# Patient Record
Sex: MALE | Race: WHITE | NOT HISPANIC OR LATINO | URBAN - METROPOLITAN AREA
[De-identification: names, ages, dates, MRNs, and addresses within clinical notes are randomized per-mention and may not be internally consistent; named-entity substitution may affect disease eponyms.]

---

## 2023-11-09 ENCOUNTER — NURSING HOME VISIT (OUTPATIENT)
Dept: POST ACUTE CARE | Facility: EXTERNAL LOCATION | Age: 78
End: 2023-11-09
Payer: MEDICARE

## 2023-11-09 VITALS — TEMPERATURE: 98.8 F | DIASTOLIC BLOOD PRESSURE: 61 MMHG | HEART RATE: 83 BPM | SYSTOLIC BLOOD PRESSURE: 100 MMHG

## 2023-11-09 DIAGNOSIS — F41.9 ANXIETY: ICD-10-CM

## 2023-11-09 DIAGNOSIS — S06.5XAA SDH (SUBDURAL HEMATOMA) (MULTI): ICD-10-CM

## 2023-11-09 DIAGNOSIS — I48.20 CHRONIC ATRIAL FIBRILLATION (MULTI): Primary | ICD-10-CM

## 2023-11-09 DIAGNOSIS — M62.81 MUSCLE WEAKNESS (GENERALIZED): ICD-10-CM

## 2023-11-09 DIAGNOSIS — Z85.118 PERSONAL HISTORY OF OTHER MALIGNANT NEOPLASM OF BRONCHUS AND LUNG: ICD-10-CM

## 2023-11-09 DIAGNOSIS — M15.9 PRIMARY OSTEOARTHRITIS INVOLVING MULTIPLE JOINTS: ICD-10-CM

## 2023-11-09 DIAGNOSIS — Z93.3 COLOSTOMY IN PLACE (MULTI): ICD-10-CM
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
## 2023-11-09 PROBLEM — R45.851 SUICIDAL IDEATION: Status: ACTIVE | Noted: 2023-11-09

## 2023-11-09 PROBLEM — R29.6 FALLS: Status: ACTIVE | Noted: 2023-11-09

## 2023-11-09 PROBLEM — G47.33 OSA (OBSTRUCTIVE SLEEP APNEA): Status: ACTIVE | Noted: 2023-11-09

## 2023-11-09 PROBLEM — K57.50 DIVERTICUL DISEASE SMALL AND LARGE INTESTINE, NO PERFORATI OR ABSCESS: Status: ACTIVE | Noted: 2023-11-09

## 2023-11-09 PROBLEM — G20.A1 PARKINSON'S DISEASE WITHOUT DYSKINESIA OR FLUCTUATING MANIFESTATIONS (MULTI): Status: ACTIVE | Noted: 2023-11-09
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
## 2023-11-09 PROBLEM — M15.0 PRIMARY OSTEOARTHRITIS INVOLVING MULTIPLE JOINTS: Status: ACTIVE | Noted: 2023-11-09

## 2023-11-09 PROCEDURE — 99342 HOME/RES VST NEW LOW MDM 30: CPT | Performed by: NURSE PRACTITIONER

## 2023-11-09 RX ORDER — FLECAINIDE ACETATE 150 MG/1
0.5 TABLET ORAL
COMMUNITY
Start: 2014-03-24 | End: 2024-02-15 | Stop reason: SDUPTHER

## 2023-11-09 ASSESSMENT — ENCOUNTER SYMPTOMS
SHORTNESS OF BREATH: 0
WEAKNESS: 0
FEVER: 0
AGITATION: 0
NERVOUS/ANXIOUS: 0
SLEEP DISTURBANCE: 0
DIARRHEA: 0
FATIGUE: 0
CONSTIPATION: 0
NAUSEA: 0
COUGH: 0
VOMITING: 0

## 2023-11-09 NOTE — PROGRESS NOTES
Subjective   Hugo Mccauley is a 78 y.o. male who is assisted living/ home patient being seen and evaluated for multiple medical problems.    New admission to [facility] after a long hospitalization at [facility]. On 9/24/23 he fell while at a football game, hitting his head, he was on coumadin and developed a SDH. He had a recent colostomy placed a few weeks prior after a episode of diverticulitis. Per records he also had some SI, had been drinking alcohol a lot following the death of his wife. He has PMH of parkinson's, AF,HTN, alcohol abuse. Was not sent on coumadin, current medications include, mirtazapine, metoprolol, Buspar and flecainide.     Today he is feeling well, denies any headache or problems, [facility] PT/OT/ Nursing. Will need ostomy teaching and help with changing. Currently using a walker for ambulation. He would like to return home in a week or 2. Per staff family may want him to stay. He has been participating with all activities at the facility and seems to be adjusting.    50% of time was spent on preparing to see the patient, performing exam or evaluation, coordination of care, ordering medications,tests and labs, referring and communicating with other health care providers , interpreting results, obtaining and reviewing history, tests, medications and documenting clinical information  EMR. Time spent >35 minutes         Review of Systems   Constitutional:  Negative for fatigue and fever.   Respiratory:  Negative for cough and shortness of breath.    Cardiovascular:  Negative for chest pain and leg swelling.   Gastrointestinal:  Negative for constipation, diarrhea, nausea and vomiting.   Skin:  Negative for pallor and rash.   Neurological:  Negative for weakness.   Psychiatric/Behavioral:  Negative for agitation, behavioral problems and sleep disturbance. The patient is not nervous/anxious.        Objective   /61   Pulse 83   Temp 37.1 °C (98.8 °F)     Physical Exam  Vitals and nursing note  reviewed.   Constitutional:       General: He is not in acute distress.  HENT:      Head: Normocephalic and atraumatic.   Eyes:      Pupils: Pupils are equal, round, and reactive to light.   Cardiovascular:      Rate and Rhythm: Normal rate and regular rhythm.   Pulmonary:      Effort: Pulmonary effort is normal.      Breath sounds: Normal breath sounds.   Skin:     General: Skin is warm and dry.   Neurological:      Mental Status: He is alert.   Psychiatric:         Mood and Affect: Mood normal.         Assessment/Plan   Problem List Items Addressed This Visit       Anxiety     Stable  Continue same tx         Atrial fibrillation (CMS/HCC) - Primary     Current RRR  On metoprolol and flecainide         SDH (subdural hematoma) (CMS/HCC)     Traumatic after a fall         Colostomy in place (CMS/[facility])     [facility] nursing for colostomy         Primary osteoarthritis involving multiple joints    Muscle weakness (generalized)    Personal history of other malignant neoplasm of bronchus and lung    Falls     PT/OT          CBC,CMP baseline

## 2023-11-09 NOTE — LETTER
Patient: Hugo Mccauley  : 1945    Encounter Date: 2023    Subjective  Hugo Mccauley is a 78 y.o. male who is assisted living/ home patient being seen and evaluated for multiple medical problems.    New admission to [facility] after a long hospitalization at [facility]. On 23 he fell while at a football game, hitting his head, he was on coumadin and developed a SDH. He had a recent colostomy placed a few weeks prior after a episode of diverticulitis. Per records he also had some SI, had been drinking alcohol a lot following the death of his wife. He has PMH of parkinson's, AF,HTN, alcohol abuse. Was not sent on coumadin, current medications include, mirtazapine, metoprolol, Buspar and flecainide.     Today he is feeling well, denies any headache or problems, [facility] PT/OT/ Nursing. Will need ostomy teaching and help with changing. Currently using a walker for ambulation. He would like to return home in a week or 2. Per staff family may want him to stay. He has been participating with all activities at the facility and seems to be adjusting.    50% of time was spent on preparing to see the patient, performing exam or evaluation, coordination of care, ordering medications,tests and labs, referring and communicating with other health care providers , interpreting results, obtaining and reviewing history, tests, medications and documenting clinical information  EMR. Time spent >35 minutes         Review of Systems   Constitutional:  Negative for fatigue and fever.   Respiratory:  Negative for cough and shortness of breath.    Cardiovascular:  Negative for chest pain and leg swelling.   Gastrointestinal:  Negative for constipation, diarrhea, nausea and vomiting.   Skin:  Negative for pallor and rash.   Neurological:  Negative for weakness.   Psychiatric/Behavioral:  Negative for agitation, behavioral problems and sleep disturbance. The patient is not nervous/anxious.        Objective  /61   Pulse 83    Temp 37.1 °C (98.8 °F)     Physical Exam  Vitals and nursing note reviewed.   Constitutional:       General: He is not in acute distress.  HENT:      Head: Normocephalic and atraumatic.   Eyes:      Pupils: Pupils are equal, round, and reactive to light.   Cardiovascular:      Rate and Rhythm: Normal rate and regular rhythm.   Pulmonary:      Effort: Pulmonary effort is normal.      Breath sounds: Normal breath sounds.   Skin:     General: Skin is warm and dry.   Neurological:      Mental Status: He is alert.   Psychiatric:         Mood and Affect: Mood normal.         Assessment/Plan  Problem List Items Addressed This Visit       Anxiety     Stable  Continue same tx         Atrial fibrillation (CMS/HCC) - Primary     Current RRR  On metoprolol and flecainide         SDH (subdural hematoma) (CMS/[facility])     Traumatic after a fall         Colostomy in place (CMS/[facility])     [facility] nursing for colostomy         Primary osteoarthritis involving multiple joints    Muscle weakness (generalized)    Personal history of other malignant neoplasm of bronchus and lung    Falls     PT/OT          CBC,CMP baseline      Electronically Signed By: JIM Hernandez-CNP   11/9/23  3:12 PM

## 2023-11-30 ENCOUNTER — NURSING HOME VISIT (OUTPATIENT)
Dept: POST ACUTE CARE | Facility: EXTERNAL LOCATION | Age: 78
End: 2023-11-30
Payer: MEDICARE

## 2023-11-30 VITALS — HEART RATE: 74 BPM | SYSTOLIC BLOOD PRESSURE: 130 MMHG | WEIGHT: 147.8 LBS | DIASTOLIC BLOOD PRESSURE: 77 MMHG

## 2023-11-30 DIAGNOSIS — I48.20 CHRONIC ATRIAL FIBRILLATION (MULTI): Primary | ICD-10-CM

## 2023-11-30 DIAGNOSIS — I95.9 HYPOTENSION, UNSPECIFIED HYPOTENSION TYPE: ICD-10-CM

## 2023-11-30 PROCEDURE — 99349 HOME/RES VST EST MOD MDM 40: CPT | Performed by: NURSE PRACTITIONER

## 2023-11-30 ASSESSMENT — ENCOUNTER SYMPTOMS
COUGH: 0
PALPITATIONS: 0
SHORTNESS OF BREATH: 0
CHEST TIGHTNESS: 0
LIGHT-HEADEDNESS: 0
DIZZINESS: 0

## 2023-11-30 NOTE — LETTER
Patient: Hugo Mccauley  : 1945    Encounter Date: 2023    Subjective  Hugo Mccauley is a 78 y.o. male who is assisted living/ home patient being seen and evaluated for multiple medical problems.    Seen today after call placed to his daughter to answer any concerns, She was wondering about his blood work, weight and BP which was reported to her as low by physical therapy 86/47, 97/49 one day. HR stable at 60-70. His vital signs were reviewed and the rest have been 130//80. Discussed recent blood work which was good and all improved, his weight is also up since hospitalization. He is not on any hypertensive medication and does not have a Dx of HTN. He is however on a low dose of a BB,  Metoprolol 12.5mg BID for his Afib.   He is feeling well, planning on going to a  today, using his cane to ambulate, steady gait. Eating and drinking well. Staff states his stool is formed and no diarrhea    50% of time was spent on preparing to see the patient, performing exam or evaluation, coordination of care, ordering medications,tests and labs, referring and communicating with other health care providers , interpreting results, obtaining and reviewing history, tests, medications and documenting clinical information  EMR. Talking with family. Time spent >35 minutes         Review of Systems   Respiratory:  Negative for cough, chest tightness and shortness of breath.    Cardiovascular:  Negative for palpitations and leg swelling.   Neurological:  Negative for dizziness and light-headedness.       Objective  /77   Pulse 74   Wt 67 kg (147 lb 12.8 oz)     Physical Exam  Vitals and nursing note reviewed.   Constitutional:       General: He is not in acute distress.  HENT:      Head: Normocephalic and atraumatic.   Cardiovascular:      Rate and Rhythm: Normal rate. Rhythm irregular.   Pulmonary:      Effort: Pulmonary effort is normal.      Breath sounds: Normal breath sounds.   Skin:     General:  Skin is warm and dry.   Neurological:      Mental Status: He is alert and oriented to person, place, and time.   Psychiatric:         Mood and Affect: Mood normal.         Assessment/Plan  Problem List Items Addressed This Visit       Atrial fibrillation (CMS/HCC) - Primary     Rate controlled  Follows with cardiology at [facility], Dr Chao  Has appointment with a neurosurgeon for follow up on SDH  Has been off anticoagulants since September  On metoprolol and flecainide           Other Visit Diagnoses       Hypotension, unspecified hypotension type        x1 per PT/OT  Now resolved  monitor            Electronically Signed By: MILANA Hernandez   11/30/23  3:10 PM

## 2023-11-30 NOTE — PROGRESS NOTES
Subjective   Hugo Mccauley is a 78 y.o. male who is assisted living/ home patient being seen and evaluated for multiple medical problems.    Seen today after call placed to his daughter to answer any concerns, She was wondering about his blood work, weight and BP which was reported to her as low by physical therapy 86/47, 97/49 one day. HR stable at 60-70. His vital signs were reviewed and the rest have been 130//80. Discussed recent blood work which was good and all improved, his weight is also up since hospitalization. He is not on any hypertensive medication and does not have a Dx of HTN. He is however on a low dose of a BB,  Metoprolol 12.5mg BID for his Afib.   He is feeling well, planning on going to a  today, using his cane to ambulate, steady gait. Eating and drinking well. Staff states his stool is formed and no diarrhea    50% of time was spent on preparing to see the patient, performing exam or evaluation, coordination of care, ordering medications,tests and labs, referring and communicating with other health care providers , interpreting results, obtaining and reviewing history, tests, medications and documenting clinical information  EMR. Talking with family. Time spent >35 minutes         Review of Systems   Respiratory:  Negative for cough, chest tightness and shortness of breath.    Cardiovascular:  Negative for palpitations and leg swelling.   Neurological:  Negative for dizziness and light-headedness.       Objective   /77   Pulse 74   Wt 67 kg (147 lb 12.8 oz)     Physical Exam  Vitals and nursing note reviewed.   Constitutional:       General: He is not in acute distress.  HENT:      Head: Normocephalic and atraumatic.   Cardiovascular:      Rate and Rhythm: Normal rate. Rhythm irregular.   Pulmonary:      Effort: Pulmonary effort is normal.      Breath sounds: Normal breath sounds.   Skin:     General: Skin is warm and dry.   Neurological:      Mental Status: He is alert and  oriented to person, place, and time.   Psychiatric:         Mood and Affect: Mood normal.         Assessment/Plan   Problem List Items Addressed This Visit       Atrial fibrillation (CMS/HCC) - Primary     Rate controlled  Follows with cardiology at [facility], Dr Chao  Has appointment with a neurosurgeon for follow up on SDH  Has been off anticoagulants since September  On metoprolol and flecainide           Other Visit Diagnoses       Hypotension, unspecified hypotension type        x1 per PT/OT  Now resolved  monitor

## 2023-12-07 ENCOUNTER — NURSING HOME VISIT (OUTPATIENT)
Dept: POST ACUTE CARE | Facility: EXTERNAL LOCATION | Age: 78
End: 2023-12-07
Payer: MEDICARE

## 2023-12-07 VITALS
HEART RATE: 63 BPM | BODY MASS INDEX: 22.63 KG/M2 | SYSTOLIC BLOOD PRESSURE: 145 MMHG | HEIGHT: 69 IN | WEIGHT: 152.8 LBS | TEMPERATURE: 98.1 F | DIASTOLIC BLOOD PRESSURE: 70 MMHG

## 2023-12-07 DIAGNOSIS — L85.3 DRY SKIN DERMATITIS: ICD-10-CM

## 2023-12-07 DIAGNOSIS — L23.89 ALLERGIC CONTACT DERMATITIS DUE TO OTHER AGENTS: Primary | ICD-10-CM

## 2023-12-07 PROBLEM — L25.9 CONTACT DERMATITIS: Status: ACTIVE | Noted: 2023-12-07

## 2023-12-07 PROBLEM — L25.9 CONTACT DERMATITIS: Status: RESOLVED | Noted: 2023-12-07 | Resolved: 2023-12-07

## 2023-12-07 PROCEDURE — 99349 HOME/RES VST EST MOD MDM 40: CPT | Performed by: NURSE PRACTITIONER

## 2023-12-07 ASSESSMENT — ENCOUNTER SYMPTOMS: FEVER: 0

## 2023-12-07 NOTE — ASSESSMENT & PLAN NOTE
possibly from laundry detergent or soaps, also could be from his dry skin  Start Clobatasol 0.05% topical apply to chest and abdomen BID x 14 days   Use hypoallergenic soaps, lotions and detergents only  Use CeraVe lotion daily to entire body and after showers

## 2023-12-07 NOTE — LETTER
"Patient: Hugo Mccauley  : 1945    Encounter Date: 2023    Subjective  Hugo Mccauley is a 78 y.o. male who is assisted living/ home patient being seen and evaluated for multiple medical problems.    Seen today for new rash that developed on his chest, scattered erythema, raised lesions, some open and scabbed on chest, denies any itching or pain. His skin is very dry and flaky. He denies scratching them, denies using any new soaps or lotions         Review of Systems   Constitutional:  Negative for fever.   Skin:  Positive for rash.       Objective  /70   Pulse 63   Temp 36.7 °C (98.1 °F)   Ht 1.753 m (5' 9\")   Wt 69.3 kg (152 lb 12.8 oz)   BMI 22.56 kg/m²     Physical Exam  Vitals and nursing note reviewed.   Constitutional:       General: He is not in acute distress.  HENT:      Head: Normocephalic and atraumatic.   Cardiovascular:      Rate and Rhythm: Normal rate. Rhythm irregular.   Pulmonary:      Effort: Pulmonary effort is normal.      Breath sounds: Normal breath sounds.   Abdominal:      Comments: Colostomy   Skin:     General: Skin is warm and dry.      Findings: Erythema and rash present.      Comments: Multiple small raised lesions, some open, some scabbed on chest and abdomen only  Very dry skin   Neurological:      Mental Status: He is alert and oriented to person, place, and time.   Psychiatric:         Mood and Affect: Mood normal.         Assessment/Plan  Problem List Items Addressed This Visit       Allergic contact dermatitis due to other agents - Primary     possibly from laundry detergent or soaps, also could be from his dry skin  Start Clobatasol 0.05% topical apply to chest and abdomen BID x 14 days   Use hypoallergenic soaps, lotions and detergents only  Use CeraVe lotion daily to entire body and after showers         Dry skin dermatitis       Electronically Signed By: MILANA Hernandez   23  2:22 PM  "

## 2023-12-07 NOTE — PROGRESS NOTES
"Subjective   Hugo Mccauley is a 78 y.o. male who is assisted living/ home patient being seen and evaluated for multiple medical problems.    Seen today for new rash that developed on his chest, scattered erythema, raised lesions, some open and scabbed on chest, denies any itching or pain. His skin is very dry and flaky. He denies scratching them, denies using any new soaps or lotions         Review of Systems   Constitutional:  Negative for fever.   Skin:  Positive for rash.       Objective   /70   Pulse 63   Temp 36.7 °C (98.1 °F)   Ht 1.753 m (5' 9\")   Wt 69.3 kg (152 lb 12.8 oz)   BMI 22.56 kg/m²     Physical Exam  Vitals and nursing note reviewed.   Constitutional:       General: He is not in acute distress.  HENT:      Head: Normocephalic and atraumatic.   Cardiovascular:      Rate and Rhythm: Normal rate. Rhythm irregular.   Pulmonary:      Effort: Pulmonary effort is normal.      Breath sounds: Normal breath sounds.   Abdominal:      Comments: Colostomy   Skin:     General: Skin is warm and dry.      Findings: Erythema and rash present.      Comments: Multiple small raised lesions, some open, some scabbed on chest and abdomen only  Very dry skin   Neurological:      Mental Status: He is alert and oriented to person, place, and time.   Psychiatric:         Mood and Affect: Mood normal.         Assessment/Plan   Problem List Items Addressed This Visit       Allergic contact dermatitis due to other agents - Primary     possibly from laundry detergent or soaps, also could be from his dry skin  Start Clobatasol 0.05% topical apply to chest and abdomen BID x 14 days   Use hypoallergenic soaps, lotions and detergents only  Use CeraVe lotion daily to entire body and after showers         Dry skin dermatitis       "

## 2023-12-14 ENCOUNTER — NURSING HOME VISIT (OUTPATIENT)
Dept: POST ACUTE CARE | Facility: EXTERNAL LOCATION | Age: 78
End: 2023-12-14
Payer: MEDICARE

## 2023-12-14 DIAGNOSIS — L85.3 DRY SKIN DERMATITIS: Primary | ICD-10-CM

## 2023-12-14 DIAGNOSIS — B35.1 ONYCHOMYCOSIS: ICD-10-CM

## 2023-12-14 DIAGNOSIS — U07.1 COVID-19: ICD-10-CM

## 2023-12-14 PROCEDURE — 99349 HOME/RES VST EST MOD MDM 40: CPT | Performed by: INTERNAL MEDICINE

## 2023-12-14 NOTE — LETTER
Patient: Hugo Mccauley  : 1945    Encounter Date: 2023    Follow-up visit  Patient tested positive for COVID-19 approximately 4 days ago.  He has no significant symptoms.  No shortness of breath cough fever chills.  No GI distress.  He does feel warm at times but his temps have been afebrile.    Patient still complains of some skin areas that have developed on his arms and legs as well as his chest.  And possibly his back.  Minimal itching.    He also complains of fungal infection of his great toe.    Medications and orders were reviewed.  The patient had been started on Paxlovid which will be completed as of today.  No significant side effects.  We did review the soaps that the patient was bathing with which appear to be quite harsh in the form of Dial hand soap and Dial body cleanse.    Physical exam  Vital signs are stable the patient appears afebrile his pulse ox is within normal limits on room air.  He is alert oriented x 3 no apparent distress.  Lungs are clear heart rate and rhythm is regular abdomen is soft nontender.  Skin does demonstrate some faint macular spotty patches on the chest shoulders arms and legs.  There are a few in number.  There is evidence of onychomycosis involving his right first toe.  Other toes appear unremarkable.    Assessment and care plan  Recent onset of COVID-19.  The patient appears stable and relatively asymptomatic and is tolerated a course of Paxlovid.  We believe he will be able to be out of quarantine in the next day or so as long as he test negative.  And we are and in accordance with public health guidelines    Onychomycosis which we will asked to have the patient follow-up with the facility podiatrist in the meantime we will start Jublia 10% solution once daily to the affected toenail for 10 months.    Regarding the patient's dermatitis we have instructed the patient to use a different type of soap such as Dove moisturizer soap and stop using  Dial.      Electronically Signed By: Checo Scott DO   12/14/23 12:54 PM

## 2023-12-14 NOTE — PROGRESS NOTES
Follow-up visit  Patient tested positive for COVID-19 approximately 4 days ago.  He has no significant symptoms.  No shortness of breath cough fever chills.  No GI distress.  He does feel warm at times but his temps have been afebrile.    Patient still complains of some skin areas that have developed on his arms and legs as well as his chest.  And possibly his back.  Minimal itching.    He also complains of fungal infection of his great toe.    Medications and orders were reviewed.  The patient had been started on Paxlovid which will be completed as of today.  No significant side effects.  We did review the soaps that the patient was bathing with which appear to be quite harsh in the form of Dial hand soap and Dial body cleanse.    Physical exam  Vital signs are stable the patient appears afebrile his pulse ox is within normal limits on room air.  He is alert oriented x 3 no apparent distress.  Lungs are clear heart rate and rhythm is regular abdomen is soft nontender.  Skin does demonstrate some faint macular spotty patches on the chest shoulders arms and legs.  There are a few in number.  There is evidence of onychomycosis involving his right first toe.  Other toes appear unremarkable.    Assessment and care plan  Recent onset of COVID-19.  The patient appears stable and relatively asymptomatic and is tolerated a course of Paxlovid.  We believe he will be able to be out of quarantine in the next day or so as long as he test negative.  And we are and in accordance with public health guidelines    Onychomycosis which we will asked to have the patient follow-up with the facility podiatrist in the meantime we will start Jublia 10% solution once daily to the affected toenail for 10 months.    Regarding the patient's dermatitis we have instructed the patient to use a different type of soap such as Dove moisturizer soap and stop using Dial.  
There are no Wet Read(s) to document.

## 2024-01-09 ENCOUNTER — NURSING HOME VISIT (OUTPATIENT)
Dept: POST ACUTE CARE | Facility: EXTERNAL LOCATION | Age: 79
End: 2024-01-09
Payer: MEDICARE

## 2024-01-09 DIAGNOSIS — I48.20 CHRONIC ATRIAL FIBRILLATION (MULTI): Primary | ICD-10-CM

## 2024-01-09 DIAGNOSIS — U07.1 COVID-19: ICD-10-CM

## 2024-01-09 DIAGNOSIS — F41.9 ANXIETY: ICD-10-CM

## 2024-01-09 PROCEDURE — 99348 HOME/RES VST EST LOW MDM 30: CPT | Performed by: INTERNAL MEDICINE

## 2024-01-09 NOTE — PROGRESS NOTES
Follow-up visit  No issues reported by staff.  Patient reports some left-sided flank pain the last couple days but is in mild to moderate severity.  He denies any changes in bowel habits nausea vomiting diarrhea.  No dysuria no blood in his urine.  No other complaints noted.  No issues reported by staff.  The pain does not appear to be positional and appears to be somewhat stable in pattern and tolerable    Patient had COVID-19 a few weeks ago but this appears to resolved without any significant sequelae    Medications and orders were reviewed.  Patient reports that he is switching pharmacies to [place] so we will redirect to [facility] to the pharmacy.    Physical exam blood pressure is 145/70, temp is 98.1, pulse is 63, pulse ox is 98% on room air  He is alert and orient x 3 no apparent distress.  Lungs are clear.  Heart rate and rhythm is regular.  Abdomen is soft nontender.  There is no no tenderness to palpation along the left flank.  No skin changes or rashes noted.  No peripheral edema.  Neurologically the patient is grossly intact.    Assessment and care plan  Overall the patient appears to be stable.  No evidence of significant tachyarrhythmias given his history of atrial fibrillation.  Blood pressure overall appears to be reasonably controlled.  No significant issues with anxiety or depression and the patient appears to be functioning well  No apparent sequelae from the COVID-19    Prior labs of November 2023 suggested a mild anemia 11.4.  Chemistries were unremarkable.  We will recheck his blood count along with CBC folate and and B12

## 2024-01-09 NOTE — LETTER
Patient: Hugo Mccauley  : 1945    Encounter Date: 2024    Follow-up visit  No issues reported by staff.  Patient reports some left-sided flank pain the last couple days but is in mild to moderate severity.  He denies any changes in bowel habits nausea vomiting diarrhea.  No dysuria no blood in his urine.  No other complaints noted.  No issues reported by staff.  The pain does not appear to be positional and appears to be somewhat stable in pattern and tolerable    Patient had COVID-19 a few weeks ago but this appears to resolved without any significant sequelae    Medications and orders were reviewed.  Patient reports that he is switching pharmacies to [facility] so we will redirect to [facility] to the pharmacy.    Physical exam blood pressure is 145/70, temp is 98.1, pulse is 63, pulse ox is 98% on room air  He is alert and orient x 3 no apparent distress.  Lungs are clear.  Heart rate and rhythm is regular.  Abdomen is soft nontender.  There is no no tenderness to palpation along the left flank.  No skin changes or rashes noted.  No peripheral edema.  Neurologically the patient is grossly intact.    Assessment and care plan  Overall the patient appears to be stable.  No evidence of significant tachyarrhythmias given his history of atrial fibrillation.  Blood pressure overall appears to be reasonably controlled.  No significant issues with anxiety or depression and the patient appears to be functioning well  No apparent sequelae from the COVID-19    Prior labs of 2023 suggested a mild anemia 11.4.  Chemistries were unremarkable.  We will recheck his blood count along with CBC folate and and B12      Electronically Signed By: Checo Scott DO   24  2:17 PM

## 2024-02-15 DIAGNOSIS — I48.91 ATRIAL FIBRILLATION, UNSPECIFIED TYPE (MULTI): Primary | ICD-10-CM

## 2024-02-15 RX ORDER — FLECAINIDE ACETATE 150 MG/1
75 TABLET ORAL
Qty: 180 TABLET | Refills: 1 | Status: SHIPPED | OUTPATIENT
Start: 2024-02-15

## 2024-03-22 ENCOUNTER — NURSING HOME VISIT (OUTPATIENT)
Dept: POST ACUTE CARE | Facility: EXTERNAL LOCATION | Age: 79
End: 2024-03-22
Payer: MEDICARE

## 2024-03-22 VITALS
DIASTOLIC BLOOD PRESSURE: 83 MMHG | TEMPERATURE: 98.1 F | RESPIRATION RATE: 16 BRPM | SYSTOLIC BLOOD PRESSURE: 156 MMHG | BODY MASS INDEX: 22.59 KG/M2 | HEART RATE: 61 BPM | OXYGEN SATURATION: 99 % | WEIGHT: 153 LBS

## 2024-03-22 DIAGNOSIS — Z93.3 COLOSTOMY IN PLACE (MULTI): ICD-10-CM

## 2024-03-22 DIAGNOSIS — G20.A1 PARKINSON'S DISEASE WITHOUT DYSKINESIA OR FLUCTUATING MANIFESTATIONS (MULTI): ICD-10-CM

## 2024-03-22 DIAGNOSIS — I48.20 CHRONIC ATRIAL FIBRILLATION (MULTI): Primary | ICD-10-CM

## 2024-03-22 DIAGNOSIS — F41.9 ANXIETY: ICD-10-CM

## 2024-03-22 PROCEDURE — 99349 HOME/RES VST EST MOD MDM 40: CPT | Performed by: NURSE PRACTITIONER

## 2024-03-22 ASSESSMENT — ENCOUNTER SYMPTOMS
CHILLS: 0
COUGH: 0
PALPITATIONS: 0
DIFFICULTY URINATING: 0
ABDOMINAL PAIN: 0
FATIGUE: 0
DIARRHEA: 0
SHORTNESS OF BREATH: 0
FEVER: 0
CONSTIPATION: 0
NAUSEA: 0
VOMITING: 0

## 2024-03-22 NOTE — LETTER
Patient: Hugo Mccauley  : 1945    Encounter Date: 2024    Subjective  Hugo Mccauley is a 78 y.o. male requested to be evaluated.   HPI He requested to be evaluated to discuss depression and anxiety medications.  He was out with family and they mentioned they take zoloft which works well for them and their anxiety.  He feels his sx are overall controlled on buspar, lexapro and mirtazipine but was wondering if he should add zoloft since it helps his family.       There are no family members present during time of visit.    he  denies any complaints when asked.  Eating and drinking well.   No concerns per staff.       Review of Systems   Constitutional:  Negative for chills, fatigue and fever.   Respiratory:  Negative for cough and shortness of breath.    Cardiovascular:  Negative for chest pain and palpitations.   Gastrointestinal:  Negative for abdominal pain, constipation, diarrhea, nausea and vomiting.   Genitourinary:  Negative for difficulty urinating.       Objective  /83   Pulse 61   Temp 36.7 °C (98.1 °F)   Resp 16   Wt 69.4 kg (153 lb)   SpO2 99%   BMI 22.59 kg/m²     Physical Exam  Constitutional:       General: He is not in acute distress.  HENT:      Head: Normocephalic and atraumatic.   Eyes:      Conjunctiva/sclera: Conjunctivae normal.   Cardiovascular:      Rate and Rhythm: Normal rate and regular rhythm.   Pulmonary:      Effort: Pulmonary effort is normal. No respiratory distress.      Breath sounds: Normal breath sounds.   Abdominal:      General: Bowel sounds are normal. There is no distension.      Palpations: Abdomen is soft.      Tenderness: There is no abdominal tenderness.   Musculoskeletal:         General: Normal range of motion.      Right lower leg: No edema.      Left lower leg: No edema.   Skin:     General: Skin is warm and dry.   Neurological:      General: No focal deficit present.      Mental Status: He is alert and oriented to person, place, and time.    Psychiatric:         Mood and Affect: Mood normal.         Behavior: Behavior normal.         Assessment/Plan  Problem List Items Addressed This Visit       Anxiety     He was speaking with family members who mentioned they take zoloft, he was wondering if he should be switched.  He is feeling overall controlled and no indication at this time to adjust medications.  He is currently taking buspar, lexapro and mirtazipine.  He was informed that if he starts having recurring sx will need to adjust meds.          Atrial fibrillation (CMS/HCC) - Primary     Not currently on meds, appears controlled.          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          Parkinson's disease without dyskinesia or fluctuating manifestations     Not currently on meds.  staff to monitor and notify for any changes.          meds/orders reviewed  staff to monitor and notify for any changes.  staff to monitor and notify for any changes.  Extended discussion regarding medications and that he is already taking an SSRI (lexapro) which is in the same class as zoloft.  As sx are overall well controlled would not recommend adjusting medicatiions at this time which he agrees.    Time for coordination of care was greater than 40 minutes [facility] chart review, visit and exam, discussion with nursing, therapy and ss staff.           Electronically Signed By: MILANA Hung   3/22/24  2:05 PM

## 2024-03-22 NOTE — PROGRESS NOTES
Subjective   Hugo Mccauley is a 78 y.o. male requested to be evaluated.   HPI He requested to be evaluated to discuss depression and anxiety medications.  He was out with family and they mentioned they take zoloft which works well for them and their anxiety.  He feels his sx are overall controlled on buspar, lexapro and mirtazipine but was wondering if he should add zoloft since it helps his family.       There are no family members present during time of visit.    he  denies any complaints when asked.  Eating and drinking well.   No concerns per staff.       Review of Systems   Constitutional:  Negative for chills, fatigue and fever.   Respiratory:  Negative for cough and shortness of breath.    Cardiovascular:  Negative for chest pain and palpitations.   Gastrointestinal:  Negative for abdominal pain, constipation, diarrhea, nausea and vomiting.   Genitourinary:  Negative for difficulty urinating.       Objective   /83   Pulse 61   Temp 36.7 °C (98.1 °F)   Resp 16   Wt 69.4 kg (153 lb)   SpO2 99%   BMI 22.59 kg/m²     Physical Exam  Constitutional:       General: He is not in acute distress.  HENT:      Head: Normocephalic and atraumatic.   Eyes:      Conjunctiva/sclera: Conjunctivae normal.   Cardiovascular:      Rate and Rhythm: Normal rate and regular rhythm.   Pulmonary:      Effort: Pulmonary effort is normal. No respiratory distress.      Breath sounds: Normal breath sounds.   Abdominal:      General: Bowel sounds are normal. There is no distension.      Palpations: Abdomen is soft.      Tenderness: There is no abdominal tenderness.   Musculoskeletal:         General: Normal range of motion.      Right lower leg: No edema.      Left lower leg: No edema.   Skin:     General: Skin is warm and dry.   Neurological:      General: No focal deficit present.      Mental Status: He is alert and oriented to person, place, and time.   Psychiatric:         Mood and Affect: Mood normal.         Behavior:  Behavior normal.         Assessment/Plan   Problem List Items Addressed This Visit       Anxiety     He was speaking with family members who mentioned they take zoloft, he was wondering if he should be switched.  He is feeling overall controlled and no indication at this time to adjust medications.  He is currently taking buspar, lexapro and mirtazipine.  He was informed that if he starts having recurring sx will need to adjust meds.          Atrial fibrillation (CMS/[facility]) - Primary     Not currently on meds, appears controlled.          Colostomy in place (CMS/[facility])     He is managing ostomy care independently.          Parkinson's disease without dyskinesia or fluctuating manifestations     Not currently on meds.  staff to monitor and notify for any changes.          meds/orders reviewed  staff to monitor and notify for any changes.  staff to monitor and notify for any changes.  Extended discussion regarding medications and that he is already taking an SSRI (lexapro) which is in the same class as zoloft.  As sx are overall well controlled would not recommend adjusting medicatiions at this time which he agrees.    Time for coordination of care was greater than 40 minutes [facility] chart review, visit and exam, discussion with nursing, therapy and ss staff.

## 2024-03-22 NOTE — ASSESSMENT & PLAN NOTE
He was speaking with family members who mentioned they take zoloft, he was wondering if he should be switched.  He is feeling overall controlled and no indication at this time to adjust medications.  He is currently taking buspar, lexapro and mirtazipine.  He was informed that if he starts having recurring sx will need to adjust meds.   He is already on an SSRI (lexapro) which was discussed with him that he is receiving and medication in the same class.

## 2024-04-23 ENCOUNTER — NURSING HOME VISIT (OUTPATIENT)
Dept: POST ACUTE CARE | Facility: EXTERNAL LOCATION | Age: 79
End: 2024-04-23
Payer: MEDICARE

## 2024-04-23 VITALS
SYSTOLIC BLOOD PRESSURE: 142 MMHG | DIASTOLIC BLOOD PRESSURE: 73 MMHG | BODY MASS INDEX: 27.02 KG/M2 | HEART RATE: 68 BPM | WEIGHT: 182.98 LBS

## 2024-04-23 DIAGNOSIS — G25.0 ESSENTIAL TREMOR: ICD-10-CM

## 2024-04-23 DIAGNOSIS — Z00.00 ROUTINE GENERAL MEDICAL EXAMINATION AT HEALTH CARE FACILITY: ICD-10-CM

## 2024-04-23 DIAGNOSIS — G89.4 CHRONIC PAIN SYNDROME: ICD-10-CM

## 2024-04-23 DIAGNOSIS — I48.20 CHRONIC ATRIAL FIBRILLATION (MULTI): Primary | ICD-10-CM

## 2024-04-23 DIAGNOSIS — F41.9 ANXIETY: ICD-10-CM

## 2024-04-23 DIAGNOSIS — G89.4 CHRONIC PAIN SYNDROME: Primary | ICD-10-CM

## 2024-04-23 DIAGNOSIS — S06.5XAA SDH (SUBDURAL HEMATOMA) (MULTI): ICD-10-CM

## 2024-04-23 DIAGNOSIS — G25.0 ESSENTIAL TREMOR: Primary | ICD-10-CM

## 2024-04-23 DIAGNOSIS — D64.9 ANEMIA, UNSPECIFIED TYPE: ICD-10-CM

## 2024-04-23 PROCEDURE — G0439 PPPS, SUBSEQ VISIT: HCPCS | Performed by: INTERNAL MEDICINE

## 2024-04-23 PROCEDURE — 99349 HOME/RES VST EST MOD MDM 40: CPT | Performed by: INTERNAL MEDICINE

## 2024-04-23 RX ORDER — GABAPENTIN 300 MG/1
300 CAPSULE ORAL 2 TIMES DAILY
Qty: 60 CAPSULE | Refills: 11 | Status: SHIPPED | OUTPATIENT
Start: 2024-04-23 | End: 2025-04-23

## 2024-04-23 RX ORDER — PRIMIDONE 50 MG/1
50 TABLET ORAL
Qty: 30 TABLET | Refills: 11 | Status: SHIPPED | OUTPATIENT
Start: 2024-04-23 | End: 2025-04-23

## 2024-04-23 ASSESSMENT — ACTIVITIES OF DAILY LIVING (ADL)
GROCERY_SHOPPING: NEEDS ASSISTANCE
TAKING_MEDICATION: NEEDS ASSISTANCE
DRESSING: INDEPENDENT
DOING_HOUSEWORK: NEEDS ASSISTANCE
DRESSING: INDEPENDENT
BATHING: INDEPENDENT
MANAGING_FINANCES: INDEPENDENT
BATHING: INDEPENDENT
TAKING_MEDICATION: NEEDS ASSISTANCE
DOING_HOUSEWORK: NEEDS ASSISTANCE
GROCERY_SHOPPING: NEEDS ASSISTANCE

## 2024-04-23 ASSESSMENT — ENCOUNTER SYMPTOMS
OCCASIONAL FEELINGS OF UNSTEADINESS: 0
DEPRESSION: 0
LOSS OF SENSATION IN FEET: 0

## 2024-04-23 ASSESSMENT — PATIENT HEALTH QUESTIONNAIRE - PHQ9
2. FEELING DOWN, DEPRESSED OR HOPELESS: NOT AT ALL
SUM OF ALL RESPONSES TO PHQ9 QUESTIONS 1 AND 2: 0
1. LITTLE INTEREST OR PLEASURE IN DOING THINGS: NOT AT ALL

## 2024-04-23 NOTE — LETTER
Patient: Hugo Mccauley  : 1945    Encounter Date: 2024    Subjective  Reason for Visit: Hugo Mccauley is an 78 y.o. male here for a Medicare Wellness visit.               HPI    No care team member to display     Review of Systems  No report of postural dizziness.  Patient does report chronic back pain for which she takes the gabapentin  No other chest pain palpitation shortness of breath or GI issues reported.  No issues reported by staff    Medications and orders reviewed    Objective  Vitals:  /73   Pulse 68   Wt 83 kg (182 lb 15.7 oz)   BMI 27.02 kg/m²       Physical Exam  Patient is alert and oriented x 3 no apparent distress HEENT is grossly unremarkable  Lungs are clear heart rate and rhythm is regular  Abdomen is soft nontender  No peripheral edema.  Neurologically patient appears to be grossly intact with no focal deficits.  However there is an intention tremor which the patient reports is causing great difficulty with writing.  It should be noted that the patient has had significant weight gain over the past several months in the range of approximately 30 to 40 pounds.  Assessment/Plan  Problem List Items Addressed This Visit       Anxiety    Atrial fibrillation (Multi) - Primary    SDH (subdural hematoma) (Multi)     Other Visit Diagnoses       Chronic pain syndrome        Essential tremor        Anemia, unspecified type        Routine general medical examination at health care facility            Assessment and care plan  In addition to the annual wellness visit the patient's medication list was reviewed with him we discussed his current symptomatology with particularly with his concern for essential intention tremor as well as chronic pain and the noticeable weight gain.    After review the medications with the patient we will continue gabapentin but adjust to 300 mg twice a day instead of 200 mg 3 times a day for ease of administration  Since the patient is already on Lexapro for  anxiety depression as well as BuSpar, we will wean him off the mirtazapine particular in view of his weight gain.  Because the patient has trouble with essential or intention tremor causing difficulty writing we will restart him back on his primidone which she was on previously.  We will start at 25 mg at bedtime and increase to 50 mg and monitor.    The patient was previously on atorvastatin.  We will restart this considering his cardiac issues.  We will also update his labs including a CBC CMP lipid panel magnesium thyroid vitamin D folate B12 and iron particularly given his history of mild anemia and tremors           Electronically Signed By: Checo Scott DO   4/23/24 11:20 AM

## 2024-04-23 NOTE — PROGRESS NOTES
Subjective   Reason for Visit: Hugo Mccauley is an 78 y.o. male here for a Medicare Wellness visit.               HPI    No care team member to display     Review of Systems  No report of postural dizziness.  Patient does report chronic back pain for which she takes the gabapentin  No other chest pain palpitation shortness of breath or GI issues reported.  No issues reported by staff    Medications and orders reviewed    Objective   Vitals:  /73   Pulse 68   Wt 83 kg (182 lb 15.7 oz)   BMI 27.02 kg/m²       Physical Exam  Patient is alert and oriented x 3 no apparent distress HEENT is grossly unremarkable  Lungs are clear heart rate and rhythm is regular  Abdomen is soft nontender  No peripheral edema.  Neurologically patient appears to be grossly intact with no focal deficits.  However there is an intention tremor which the patient reports is causing great difficulty with writing.  It should be noted that the patient has had significant weight gain over the past several months in the range of approximately 30 to 40 pounds.  Assessment/Plan   Problem List Items Addressed This Visit       Anxiety    Atrial fibrillation (Multi) - Primary    SDH (subdural hematoma) (Multi)     Other Visit Diagnoses       Chronic pain syndrome        Essential tremor        Anemia, unspecified type        Routine general medical examination at health care facility            Assessment and care plan  In addition to the annual wellness visit the patient's medication list was reviewed with him we discussed his current symptomatology with particularly with his concern for essential intention tremor as well as chronic pain and the noticeable weight gain.    After review the medications with the patient we will continue gabapentin but adjust to 300 mg twice a day instead of 200 mg 3 times a day for ease of administration  Since the patient is already on Lexapro for anxiety depression as well as BuSpar, we will wean him off the  mirtazapine particular in view of his weight gain.  Because the patient has trouble with essential or intention tremor causing difficulty writing we will restart him back on his primidone which she was on previously.  We will start at 25 mg at bedtime and increase to 50 mg and monitor.    The patient was previously on atorvastatin.  We will restart this considering his cardiac issues.  We will also update his labs including a CBC CMP lipid panel magnesium thyroid vitamin D folate B12 and iron particularly given his history of mild anemia and tremors

## 2024-05-06 ENCOUNTER — NURSING HOME VISIT (OUTPATIENT)
Dept: POST ACUTE CARE | Facility: EXTERNAL LOCATION | Age: 79
End: 2024-05-06
Payer: MEDICARE

## 2024-05-06 DIAGNOSIS — J20.9 ACUTE BRONCHITIS, UNSPECIFIED ORGANISM: Primary | ICD-10-CM

## 2024-05-06 PROCEDURE — 99348 HOME/RES VST EST LOW MDM 30: CPT | Performed by: INTERNAL MEDICINE

## 2024-05-06 NOTE — PROGRESS NOTES
Follow-up visit  Patient request to be seen today.  States that for the last couple of days he has had a productive cough with green sputum.  He denies fever chills malaise or shortness of breath and no GI issues.  But he is concerned.    Medications and orders reviewed    Physical exam  Patient is alert and orient x 3 no apparent distress.  He does demonstrate a moist harsh cough.  HEENT is grossly unremarkable.  Vital signs appear to be stable and afebrile  Lungs demonstrate early to mid scattered expiratory wheezes and rhonchi.  Heart rate and rhythm is regular    Assessment and care plan  Acute bronchitis with productive cough.  The patient otherwise appears to be reasonably well and in no significant distress.  However there is evidence of bronchospasm.  We will start the patient on Z-Stephen.  Also add DuoNeb aerosols 3 times a day for 10 days and monitor his progress

## 2024-05-06 NOTE — LETTER
Patient: Hugo Mccauley  : 1945    Encounter Date: 2024    Follow-up visit  Patient request to be seen today.  States that for the last couple of days he has had a productive cough with green sputum.  He denies fever chills malaise or shortness of breath and no GI issues.  But he is concerned.    Medications and orders reviewed    Physical exam  Patient is alert and orient x 3 no apparent distress.  He does demonstrate a moist harsh cough.  HEENT is grossly unremarkable.  Vital signs appear to be stable and afebrile  Lungs demonstrate early to mid scattered expiratory wheezes and rhonchi.  Heart rate and rhythm is regular    Assessment and care plan  Acute bronchitis with productive cough.  The patient otherwise appears to be reasonably well and in no significant distress.  However there is evidence of bronchospasm.  We will start the patient on Z-Stephen.  Also add DuoNeb aerosols 3 times a day for 10 days and monitor his progress        Electronically Signed By: Checo Scott DO   24  1:20 PM

## 2024-06-13 ENCOUNTER — NURSING HOME VISIT (OUTPATIENT)
Dept: POST ACUTE CARE | Facility: EXTERNAL LOCATION | Age: 79
End: 2024-06-13
Payer: MEDICARE

## 2024-06-13 DIAGNOSIS — R11.0 NAUSEA: ICD-10-CM

## 2024-06-13 DIAGNOSIS — G47.00 INSOMNIA, UNSPECIFIED TYPE: Primary | ICD-10-CM

## 2024-06-13 DIAGNOSIS — R11.0 NAUSEA: Primary | ICD-10-CM

## 2024-06-13 PROCEDURE — 99348 HOME/RES VST EST LOW MDM 30: CPT | Performed by: INTERNAL MEDICINE

## 2024-06-13 RX ORDER — ONDANSETRON 4 MG/1
4 TABLET, FILM COATED ORAL EVERY 8 HOURS PRN
Qty: 20 TABLET | Refills: 2 | Status: SHIPPED | OUTPATIENT
Start: 2024-06-13 | End: 2024-07-03

## 2024-06-13 NOTE — LETTER
Patient: Hugo Mccauley  : 1945    Encounter Date: 2024    Follow-up visit  Patient states she periodically gets nausea almost every day.  His heartburn and indigestion is much improved taking a regular dose of pantoprazole 40 mg daily.  He wishes to use more regular dosing of the Zofran.    Medications and orders were reviewed.  The patient apparently is taking over-the-counter relaxing him for sleep as well.  He also has a standing order for melatonin.  We advised him that the relaxing him may cause some GI upset based on recorded side effects.    Physical exam  Vital signs are stable the patient is afebrile he is in no apparent distress.  Review of systems no significant GI issues at the moment.  Overall he does not have any significant heartburn or indigestion since he has been on pantoprazole    Assessment and care plan  Overall the patient given his history of GERD has improved symptom control of heartburn and indigestion with the use of regular proton pump inhibitor.  We will continue this.  His episodic infrequent nausea is likely a side effect of some of his other medications that are over-the-counter such as relaxing, and melatonin.  Since relaxing has melatonin out we will discontinue the melatonin on a regular basis.  We will advise him to use the relaxing him sparingly.  In the meantime he is advised that he can use Zofran periodically as needed but not on a regular dosing basis      Electronically Signed By: Checo Scott DO   24 10:17 AM

## 2024-06-13 NOTE — PROGRESS NOTES
Follow-up visit  Patient states she periodically gets nausea almost every day.  His heartburn and indigestion is much improved taking a regular dose of pantoprazole 40 mg daily.  He wishes to use more regular dosing of the Zofran.    Medications and orders were reviewed.  The patient apparently is taking over-the-counter relaxing him for sleep as well.  He also has a standing order for melatonin.  We advised him that the relaxing him may cause some GI upset based on recorded side effects.    Physical exam  Vital signs are stable the patient is afebrile he is in no apparent distress.  Review of systems no significant GI issues at the moment.  Overall he does not have any significant heartburn or indigestion since he has been on pantoprazole    Assessment and care plan  Overall the patient given his history of GERD has improved symptom control of heartburn and indigestion with the use of regular proton pump inhibitor.  We will continue this.  His episodic infrequent nausea is likely a side effect of some of his other medications that are over-the-counter such as relaxing, and melatonin.  Since relaxing has melatonin out we will discontinue the melatonin on a regular basis.  We will advise him to use the relaxing him sparingly.  In the meantime he is advised that he can use Zofran periodically as needed but not on a regular dosing basis

## 2024-07-29 ENCOUNTER — NURSING HOME VISIT (OUTPATIENT)
Dept: POST ACUTE CARE | Facility: EXTERNAL LOCATION | Age: 79
End: 2024-07-29
Payer: MEDICARE

## 2024-07-29 DIAGNOSIS — R11.0 NAUSEA: Primary | ICD-10-CM

## 2024-07-29 PROCEDURE — 99348 HOME/RES VST EST LOW MDM 30: CPT | Performed by: INTERNAL MEDICINE

## 2024-07-29 RX ORDER — ONDANSETRON 4 MG/1
4 TABLET, FILM COATED ORAL EVERY 8 HOURS PRN
Qty: 20 TABLET | Refills: 1 | Status: SHIPPED | OUTPATIENT
Start: 2024-07-29 | End: 2024-09-27

## 2024-07-29 NOTE — PROGRESS NOTES
Follow-up visit  Patient has to be seen regarding persistent nausea.  He is requesting that his Zofran be increased.  Staff reports that he is taking this on a regular basis 3 times a day.  He has been advised to stop his over-the-counter supplements and over-the-counter sleep aids as these may be contributing to his nausea but he has resisted this and continues taking the over-the-counter medications.    Medications and orders were reviewed.    Physical exam  Vital signs are stable the patient is afebrile HEENT is grossly unremarkable.  He is alert ambulatory and appears to be in no significant distress.  Apparently he has still been able to eat.  No other GI symptoms reported.  No reports of emesis.    Additional history  Patient has a history of stage  for melanoma which apparently is in remission.  Recent CAT scan was done of his chest and abdomen which were unremarkable according to the patient.    Assessment and care plan  Persistent nausea.  Uncertain etiology.  We suspect that some of his over-the-counter medications may contribute to this but other gastrointestinal issues cannot be ruled out particularly given his history of melanoma.  We have advised the patient that it is not wise to take Zofran regularly day in and day out and we have it advised him that we do not want to increase the dose.  We have suggested that he have a follow-up with his GI specialist to get a better assessment of his persistent nausea.  We will ask the staff to work with him to help arrange this

## 2024-07-29 NOTE — LETTER
Patient: Hugo Mccauley  : 1945    Encounter Date: 2024    Follow-up visit  Patient has to be seen regarding persistent nausea.  He is requesting that his Zofran be increased.  Staff reports that he is taking this on a regular basis 3 times a day.  He has been advised to stop his over-the-counter supplements and over-the-counter sleep aids as these may be contributing to his nausea but he has resisted this and continues taking the over-the-counter medications.    Medications and orders were reviewed.    Physical exam  Vital signs are stable the patient is afebrile HEENT is grossly unremarkable.  He is alert ambulatory and appears to be in no significant distress.  Apparently he has still been able to eat.  No other GI symptoms reported.  No reports of emesis.    Additional history  Patient has a history of stage  for melanoma which apparently is in remission.  Recent CAT scan was done of his chest and abdomen which were unremarkable according to the patient.    Assessment and care plan  Persistent nausea.  Uncertain etiology.  We suspect that some of his over-the-counter medications may contribute to this but other gastrointestinal issues cannot be ruled out particularly given his history of melanoma.  We have advised the patient that it is not wise to take Zofran regularly day in and day out and we have it advised him that we do not want to increase the dose.  We have suggested that he have a follow-up with his GI specialist to get a better assessment of his persistent nausea.  We will ask the staff to work with him to help arrange this      Electronically Signed By: Checo Scott DO   24  1:36 PM

## 2024-08-03 ENCOUNTER — NURSING HOME VISIT (OUTPATIENT)
Dept: POST ACUTE CARE | Facility: EXTERNAL LOCATION | Age: 79
End: 2024-08-03
Payer: COMMERCIAL

## 2024-08-03 DIAGNOSIS — B02.9 HERPES ZOSTER WITHOUT COMPLICATION: Primary | ICD-10-CM

## 2024-08-03 NOTE — LETTER
Patient: Hugo Mccauley  : 1945    Encounter Date: 2024    Follow-up visit  Patient seen today in because he had concern that he was diagnosed with shingles on his back.  Patient reports that symptoms started a couple of weeks ago.  He saw the dermatologist 5 days ago and was told he had shingles and was given a steroid cream to use but was told that given the timing of the onset it was too late to try any antiviral medication.    Medications and orders reviewed.    Physical exam  Vital signs are stable the patient is afebrile is in no apparent distress  Skin demonstrates a scattered papular somewhat crusted rash along the left chest and upper back in a dermatomal type fashion.  No new blisters are noted.    Assessment and care plan  Patient does have evidence of shingles.  However it is too late to use an antiviral and in any event the episode appears to be mild and the patient has minimal symptoms consisting only of itchiness.  No other symptoms of significant pain or neuropathy.  We will continue to monitor and advised the patient to follow the dermatologist advice and use a steroid cream as directed      Electronically Signed By: Checo Scott DO   8/3/24 11:46 AM

## 2024-08-03 NOTE — PROGRESS NOTES
Follow-up visit  Patient seen today in because he had concern that he was diagnosed with shingles on his back.  Patient reports that symptoms started a couple of weeks ago.  He saw the dermatologist 5 days ago and was told he had shingles and was given a steroid cream to use but was told that given the timing of the onset it was too late to try any antiviral medication.    Medications and orders reviewed.    Physical exam  Vital signs are stable the patient is afebrile is in no apparent distress  Skin demonstrates a scattered papular somewhat crusted rash along the left chest and upper back in a dermatomal type fashion.  No new blisters are noted.    Assessment and care plan  Patient does have evidence of shingles.  However it is too late to use an antiviral and in any event the episode appears to be mild and the patient has minimal symptoms consisting only of itchiness.  No other symptoms of significant pain or neuropathy.  We will continue to monitor and advised the patient to follow the dermatologist advice and use a steroid cream as directed

## 2024-08-20 ENCOUNTER — NURSING HOME VISIT (OUTPATIENT)
Dept: POST ACUTE CARE | Facility: EXTERNAL LOCATION | Age: 79
End: 2024-08-20
Payer: MEDICARE

## 2024-08-20 DIAGNOSIS — H61.23 BILATERAL IMPACTED CERUMEN: Primary | ICD-10-CM

## 2024-08-20 PROCEDURE — 99348 HOME/RES VST EST LOW MDM 30: CPT | Performed by: INTERNAL MEDICINE

## 2024-08-20 NOTE — PROGRESS NOTES
Follow-up visit  Patient was seen because of concern for earwax.  He reports that he was seen by the audiologist and that his left ear is impacted with cerumen.  Less so on the right.    Physical exam  Left ear canal is impacted with cerumen with inability to visualize the tympanic.  The right side has less wax tympanic was partially visualized.    Assessment and care plan  Bilateral cerumen impaction.  We will ask the staff to put 6 drops of Debrox otic solution in each ear twice a week for 2 weeks.  Wait 15 minutes after each administration and then irrigate with warm soapy water till clear.  Will monitor progress

## 2024-08-20 NOTE — LETTER
Patient: Hugo Mccauley  : 1945    Encounter Date: 2024    Follow-up visit  Patient was seen because of concern for earwax.  He reports that he was seen by the audiologist and that his left ear is impacted with cerumen.  Less so on the right.    Physical exam  Left ear canal is impacted with cerumen with inability to visualize the tympanic.  The right side has less wax tympanic was partially visualized.    Assessment and care plan  Bilateral cerumen impaction.  We will ask the staff to put 6 drops of Debrox otic solution in each ear twice a week for 2 weeks.  Wait 15 minutes after each administration and then irrigate with warm soapy water till clear.  Will monitor progress      Electronically Signed By: Checo Scott DO   24 12:09 PM

## 2024-11-29 ENCOUNTER — NURSING HOME VISIT (OUTPATIENT)
Dept: POST ACUTE CARE | Facility: EXTERNAL LOCATION | Age: 79
End: 2024-11-29
Payer: MEDICARE

## 2024-11-29 DIAGNOSIS — C44.91 CANCER OF THE SKIN, BASAL CELL: Primary | ICD-10-CM

## 2024-11-29 DIAGNOSIS — B02.9 HERPES ZOSTER WITHOUT COMPLICATION: ICD-10-CM

## 2024-11-29 PROCEDURE — 99347 HOME/RES VST EST SF MDM 20: CPT | Performed by: INTERNAL MEDICINE

## 2024-11-29 NOTE — PROGRESS NOTES
Follow-up visit  Patient seen today in follow-up to questions regarding medications and a recent excision of a basal cell cancer of his right ankle.    Medications and orders reviewed with the patient.  Apparently the patient had an episode of shingles on his back several weeks ago and was placed on a months worth of gabapentin.  This has been completed and he has no further complications or symptoms and is not requesting that this be continued.  The gabapentin was completed approximately 2 weeks ago when we will not restart this since it does not look like he needs it    On physical exam  There is approximately 1.5 cm circular excision area on his right ankle with no surrounding redness or drainage or odor.  There appears to be granulation tissue.  They are placing daily wound care dressings on it for surgeons recommendation.  This appears to be healing well and we will make no further changes    Assessment and care plan  Recent episode of shingles involving left upper back which appears to have resolved  Recent excision of basal cell carcinoma of the left medial ankle which appears to be healing without sign of infection.  No further changes in orders at this time

## 2024-11-29 NOTE — LETTER
Patient: Hugo Mccauley  : 1945    Encounter Date: 2024    Follow-up visit  Patient seen today in follow-up to questions regarding medications and a recent excision of a basal cell cancer of his right ankle.    Medications and orders reviewed with the patient.  Apparently the patient had an episode of shingles on his back several weeks ago and was placed on a months worth of gabapentin.  This has been completed and he has no further complications or symptoms and is not requesting that this be continued.  The gabapentin was completed approximately 2 weeks ago when we will not restart this since it does not look like he needs it    On physical exam  There is approximately 1.5 cm circular excision area on his right ankle with no surrounding redness or drainage or odor.  There appears to be granulation tissue.  They are placing daily wound care dressings on it for surgeons recommendation.  This appears to be healing well and we will make no further changes    Assessment and care plan  Recent episode of shingles involving left upper back which appears to have resolved  Recent excision of basal cell carcinoma of the left medial ankle which appears to be healing without sign of infection.  No further changes in orders at this time      Electronically Signed By: Checo Scott DO   24  1:30 PM

## 2025-03-12 ENCOUNTER — NURSING HOME VISIT (OUTPATIENT)
Dept: POST ACUTE CARE | Facility: EXTERNAL LOCATION | Age: 80
End: 2025-03-12
Payer: COMMERCIAL

## 2025-03-12 DIAGNOSIS — J30.2 SEASONAL ALLERGIES: Primary | ICD-10-CM

## 2025-03-12 DIAGNOSIS — R29.6 FALLS: ICD-10-CM

## 2025-03-12 DIAGNOSIS — M62.81 MUSCLE WEAKNESS (GENERALIZED): ICD-10-CM

## 2025-03-12 DIAGNOSIS — G20.A1 PARKINSON'S DISEASE WITHOUT DYSKINESIA OR FLUCTUATING MANIFESTATIONS: ICD-10-CM

## 2025-03-12 DIAGNOSIS — S06.5XAA SDH (SUBDURAL HEMATOMA) (MULTI): ICD-10-CM

## 2025-03-12 DIAGNOSIS — N40.0 BENIGN PROSTATIC HYPERPLASIA, UNSPECIFIED WHETHER LOWER URINARY TRACT SYMPTOMS PRESENT: ICD-10-CM

## 2025-03-12 NOTE — LETTER
Patient: Hugo Mccauley  : 1945    Encounter Date: 2025    Subjective  Patient ID: Hugo Mccauley is a 79 y.o. male.    Patient is a 79-year-old male with past medical history of hypertension, anxiety, A-fib, GERD, insomnia who resides at assisted living facility.  Patient is a long-term resident of assisted living facility, however reports that acute issues or concerns.  States that he overall feels well and is tolerating his medications, no side effects noted.  Otherwise, states no acute issues or concerns and overall feels well.         Review of Systems   Constitutional: Negative.    HENT: Negative.     Respiratory: Negative.     Cardiovascular: Negative.    Gastrointestinal: Negative.    Musculoskeletal: Negative.    Skin: Negative.    Neurological:  Positive for weakness.   Psychiatric/Behavioral: Negative.         ObjectiveVitals Reviewed via facility EMR   Physical Exam  Constitutional:       General: He is not in acute distress.     Appearance: He is not ill-appearing.   Eyes:      Pupils: Pupils are equal, round, and reactive to light.   Cardiovascular:      Rate and Rhythm: Normal rate and regular rhythm.      Pulses: Normal pulses.      Heart sounds: No murmur heard.  Pulmonary:      Effort: No respiratory distress.      Breath sounds: No wheezing.   Abdominal:      General: Abdomen is flat. Bowel sounds are normal. There is no distension.   Musculoskeletal:      Right lower leg: No edema.      Left lower leg: No edema.   Skin:     General: Skin is warm and dry.   Neurological:      Mental Status: He is alert. Mental status is at baseline.      Cranial Nerves: No cranial nerve deficit.      Motor: Weakness present.   Psychiatric:         Mood and Affect: Mood normal.         Behavior: Behavior normal.         Assessment/Plan  Diagnoses and all orders for this visit:  Seasonal allergies  Benign prostatic hyperplasia, unspecified whether lower urinary tract symptoms present  Muscle weakness  (generalized)  SDH (subdural hematoma) (Multi)  Parkinson's disease without dyskinesia or fluctuating manifestations  Falls    Patient seen and examined at bedside.  Overall medically stable, medications reviewed and reconciled.  Discussed care plan with staff as well as patient no issues noted.  Advised to call if symptoms were to worsen or issues were to arise.  Otherwise continue on current medications.  No additional issues at this time.     Reviewed and approved by AYO KAMARA on 3/14/25 at 10:07 PM.       Electronically Signed By: Ayo Kamara DO   3/14/25 10:07 PM

## 2025-03-14 ASSESSMENT — ENCOUNTER SYMPTOMS
RESPIRATORY NEGATIVE: 1
MUSCULOSKELETAL NEGATIVE: 1
WEAKNESS: 1
GASTROINTESTINAL NEGATIVE: 1
CARDIOVASCULAR NEGATIVE: 1
PSYCHIATRIC NEGATIVE: 1
CONSTITUTIONAL NEGATIVE: 1

## 2025-03-15 NOTE — PROGRESS NOTES
Subjective   Patient ID: Hugo Mccauley is a 79 y.o. male.    Patient is a 79-year-old male with past medical history of hypertension, anxiety, A-fib, GERD, insomnia who resides at assisted living facility.  Patient is a long-term resident of assisted living facility, however reports that acute issues or concerns.  States that he overall feels well and is tolerating his medications, no side effects noted.  Otherwise, states no acute issues or concerns and overall feels well.         Review of Systems   Constitutional: Negative.    HENT: Negative.     Respiratory: Negative.     Cardiovascular: Negative.    Gastrointestinal: Negative.    Musculoskeletal: Negative.    Skin: Negative.    Neurological:  Positive for weakness.   Psychiatric/Behavioral: Negative.         Objective Vitals Reviewed via facility EMR   Physical Exam  Constitutional:       General: He is not in acute distress.     Appearance: He is not ill-appearing.   Eyes:      Pupils: Pupils are equal, round, and reactive to light.   Cardiovascular:      Rate and Rhythm: Normal rate and regular rhythm.      Pulses: Normal pulses.      Heart sounds: No murmur heard.  Pulmonary:      Effort: No respiratory distress.      Breath sounds: No wheezing.   Abdominal:      General: Abdomen is flat. Bowel sounds are normal. There is no distension.   Musculoskeletal:      Right lower leg: No edema.      Left lower leg: No edema.   Skin:     General: Skin is warm and dry.   Neurological:      Mental Status: He is alert. Mental status is at baseline.      Cranial Nerves: No cranial nerve deficit.      Motor: Weakness present.   Psychiatric:         Mood and Affect: Mood normal.         Behavior: Behavior normal.         Assessment/Plan   Diagnoses and all orders for this visit:  Seasonal allergies  Benign prostatic hyperplasia, unspecified whether lower urinary tract symptoms present  Muscle weakness (generalized)  SDH (subdural hematoma) (Multi)  Parkinson's disease  without dyskinesia or fluctuating manifestations  Falls    Patient seen and examined at bedside.  Overall medically stable, medications reviewed and reconciled.  Discussed care plan with staff as well as patient no issues noted.  Advised to call if symptoms were to worsen or issues were to arise.  Otherwise continue on current medications.  No additional issues at this time.     Reviewed and approved by GERMAN KAMARA on 3/14/25 at 10:07 PM.

## 2025-04-23 ENCOUNTER — NURSING HOME VISIT (OUTPATIENT)
Dept: POST ACUTE CARE | Facility: EXTERNAL LOCATION | Age: 80
End: 2025-04-23
Payer: MEDICARE

## 2025-04-23 DIAGNOSIS — N40.0 BENIGN PROSTATIC HYPERPLASIA, UNSPECIFIED WHETHER LOWER URINARY TRACT SYMPTOMS PRESENT: ICD-10-CM

## 2025-04-23 DIAGNOSIS — Z79.899 MEDICATION MANAGEMENT: ICD-10-CM

## 2025-04-23 DIAGNOSIS — I48.20 CHRONIC ATRIAL FIBRILLATION (MULTI): Primary | ICD-10-CM

## 2025-04-23 DIAGNOSIS — G20.A1 PARKINSON'S DISEASE WITHOUT DYSKINESIA OR FLUCTUATING MANIFESTATIONS: ICD-10-CM

## 2025-04-23 DIAGNOSIS — F41.9 ANXIETY: ICD-10-CM

## 2025-04-23 PROCEDURE — 99349 HOME/RES VST EST MOD MDM 40: CPT | Performed by: STUDENT IN AN ORGANIZED HEALTH CARE EDUCATION/TRAINING PROGRAM

## 2025-04-23 ASSESSMENT — ENCOUNTER SYMPTOMS
RESPIRATORY NEGATIVE: 1
GASTROINTESTINAL NEGATIVE: 1
MUSCULOSKELETAL NEGATIVE: 1
CONSTITUTIONAL NEGATIVE: 1
CARDIOVASCULAR NEGATIVE: 1
PSYCHIATRIC NEGATIVE: 1
NEUROLOGICAL NEGATIVE: 1

## 2025-04-23 NOTE — LETTER
Patient: Hugo Mccauley  : 1945    Encounter Date: 2025    Subjective  Patient ID: Hugo Mccauley is a 79 y.o. male.    Patient seen and examined at request of facility.  They regarded that patient has many medications at his bedside including supplements that patient has purchased over-the-counter.  Patient needs ordered at the facility, and medications to be reconciled.  On evaluation, patient endorses that he no longer takes Flomax, and does not take additional medication as he is ordered this at the facility.  Takes 2 supplements that he takes 1, as well as 1 to help with sleep.  Has been taking these for years.  In regards he also takes the prescribed medications such as melatonin.  He has not had any side effects with regards to this.  Currently is not on any blood thinners or any other supplements.  Otherwise, overall feels well.         Review of Systems   Constitutional: Negative.    HENT: Negative.     Respiratory: Negative.     Cardiovascular: Negative.    Gastrointestinal: Negative.    Musculoskeletal: Negative.    Skin: Negative.    Neurological: Negative.    Psychiatric/Behavioral: Negative.         ObjectiveVitals Reviewed via facility EMR   Physical Exam  Constitutional:       General: He is not in acute distress.     Appearance: He is not ill-appearing.   Eyes:      Pupils: Pupils are equal, round, and reactive to light.   Cardiovascular:      Rate and Rhythm: Normal rate and regular rhythm.      Pulses: Normal pulses.      Heart sounds: No murmur heard.  Pulmonary:      Effort: No respiratory distress.      Breath sounds: No wheezing.   Abdominal:      General: Abdomen is flat. Bowel sounds are normal. There is no distension.   Musculoskeletal:      Right lower leg: No edema.      Left lower leg: No edema.   Skin:     General: Skin is warm and dry.   Neurological:      Mental Status: He is alert. Mental status is at baseline.      Cranial Nerves: No cranial nerve deficit.      Motor:  Weakness present.   Psychiatric:         Mood and Affect: Mood normal.         Behavior: Behavior normal.         Assessment/Plan  Diagnoses and all orders for this visit:  Chronic atrial fibrillation (Multi)  Benign prostatic hyperplasia, unspecified whether lower urinary tract symptoms present  Anxiety  Parkinson's disease without dyskinesia or fluctuating manifestations  Medication management    Patient seen and examined.  Reviewed medications at the bedside, supplement started with NEERAJ derivatives.  Recommend patient discontinue melatonin as this is also present in his supplements.  Discussed with patient that if you were need to go on blood thinners for his A-fib, you would like to also need to discontinue the supplements as well.  He is not having any side effects with flecainide.  Recommend that the patient stop taking Flomax at bedside which she is agreeable.  He will discard of these pills.  Otherwise, we will update orders in patient's chart.  Discussed care plan with staff.        Reviewed and approved by AYO KAMARA on 4/23/25 at 10:45 PM.       Electronically Signed By: Ayo Kamara DO   4/23/25 10:45 PM

## 2025-04-24 NOTE — PROGRESS NOTES
Subjective   Patient ID: Hugo Mccauley is a 79 y.o. male.    Patient seen and examined at request of facility.  They regarded that patient has many medications at his bedside including supplements that patient has purchased over-the-counter.  Patient needs ordered at the facility, and medications to be reconciled.  On evaluation, patient endorses that he no longer takes Flomax, and does not take additional medication as he is ordered this at the facility.  Takes 2 supplements that he takes 1, as well as 1 to help with sleep.  Has been taking these for years.  In regards he also takes the prescribed medications such as melatonin.  He has not had any side effects with regards to this.  Currently is not on any blood thinners or any other supplements.  Otherwise, overall feels well.         Review of Systems   Constitutional: Negative.    HENT: Negative.     Respiratory: Negative.     Cardiovascular: Negative.    Gastrointestinal: Negative.    Musculoskeletal: Negative.    Skin: Negative.    Neurological: Negative.    Psychiatric/Behavioral: Negative.         Objective Vitals Reviewed via facility EMR   Physical Exam  Constitutional:       General: He is not in acute distress.     Appearance: He is not ill-appearing.   Eyes:      Pupils: Pupils are equal, round, and reactive to light.   Cardiovascular:      Rate and Rhythm: Normal rate and regular rhythm.      Pulses: Normal pulses.      Heart sounds: No murmur heard.  Pulmonary:      Effort: No respiratory distress.      Breath sounds: No wheezing.   Abdominal:      General: Abdomen is flat. Bowel sounds are normal. There is no distension.   Musculoskeletal:      Right lower leg: No edema.      Left lower leg: No edema.   Skin:     General: Skin is warm and dry.   Neurological:      Mental Status: He is alert. Mental status is at baseline.      Cranial Nerves: No cranial nerve deficit.      Motor: Weakness present.   Psychiatric:         Mood and Affect: Mood normal.          Behavior: Behavior normal.         Assessment/Plan   Diagnoses and all orders for this visit:  Chronic atrial fibrillation (Multi)  Benign prostatic hyperplasia, unspecified whether lower urinary tract symptoms present  Anxiety  Parkinson's disease without dyskinesia or fluctuating manifestations  Medication management    Patient seen and examined.  Reviewed medications at the bedside, supplement started with NEERAJ derivatives.  Recommend patient discontinue melatonin as this is also present in his supplements.  Discussed with patient that if you were need to go on blood thinners for his A-fib, you would like to also need to discontinue the supplements as well.  He is not having any side effects with flecainide.  Recommend that the patient stop taking Flomax at bedside which she is agreeable.  He will discard of these pills.  Otherwise, we will update orders in patient's chart.  Discussed care plan with staff.        Reviewed and approved by GERMAN KAMARA on 4/23/25 at 10:45 PM.

## 2025-05-27 ENCOUNTER — NURSING HOME VISIT (OUTPATIENT)
Dept: POST ACUTE CARE | Facility: EXTERNAL LOCATION | Age: 80
End: 2025-05-27
Payer: MEDICARE

## 2025-05-27 DIAGNOSIS — M15.0 PRIMARY OSTEOARTHRITIS INVOLVING MULTIPLE JOINTS: ICD-10-CM

## 2025-05-27 DIAGNOSIS — R29.6 FALLS: ICD-10-CM

## 2025-05-27 DIAGNOSIS — K21.9 GASTROESOPHAGEAL REFLUX DISEASE WITHOUT ESOPHAGITIS: ICD-10-CM

## 2025-05-27 DIAGNOSIS — I48.20 CHRONIC ATRIAL FIBRILLATION (MULTI): Primary | ICD-10-CM

## 2025-05-27 DIAGNOSIS — Z09 HOSPITAL DISCHARGE FOLLOW-UP: ICD-10-CM

## 2025-05-27 DIAGNOSIS — K56.7 ILEUS (MULTI): ICD-10-CM

## 2025-05-27 PROCEDURE — 99349 HOME/RES VST EST MOD MDM 40: CPT | Performed by: STUDENT IN AN ORGANIZED HEALTH CARE EDUCATION/TRAINING PROGRAM

## 2025-05-27 NOTE — LETTER
Patient: Hugo Mccauley  : 1945    Encounter Date: 2025    Subjective  Patient ID: Hugo Mccauley is a 79 y.o. male.    Pt seen on hospital follow up. Regards he is doing well however recently was admitted to the hospital due to ileus/SBO. He endorses that he was admittted and improved with supportive care. Flomax was dc for the patient but he is unsure why. Otherwise he endorses that he does have good bowel sounds and bowel movements. Pain is well controlled and states that he may need additional surgeries due to hernias that were found. Otherwise no additional issues or concerns.            Review of Systems   Constitutional:  Positive for fatigue.   HENT: Negative.     Respiratory: Negative.     Cardiovascular: Negative.    Gastrointestinal: Negative.    Musculoskeletal: Negative.    Skin: Negative.    Neurological:  Positive for weakness.   Psychiatric/Behavioral: Negative.         Objective  Vitals Reviewed via facility EMR   Physical Exam  Constitutional:       General: He is not in acute distress.     Appearance: He is not ill-appearing.   Eyes:      Pupils: Pupils are equal, round, and reactive to light.   Cardiovascular:      Rate and Rhythm: Normal rate and regular rhythm.      Pulses: Normal pulses.      Heart sounds: No murmur heard.  Pulmonary:      Effort: No respiratory distress.      Breath sounds: No wheezing.   Abdominal:      General: Abdomen is flat. Bowel sounds are normal. There is no distension.      Comments: Bowel sounds present, ostomy present, having stool   Musculoskeletal:      Right lower leg: No edema.      Left lower leg: No edema.   Skin:     General: Skin is warm and dry.   Neurological:      Mental Status: He is alert. Mental status is at baseline.      Cranial Nerves: No cranial nerve deficit.      Motor: Weakness present.   Psychiatric:         Mood and Affect: Mood normal.         Behavior: Behavior normal.         Assessment/Plan  Diagnoses and all orders for this  visit:  Chronic atrial fibrillation (Multi)  Gastroesophageal reflux disease without esophagitis  Primary osteoarthritis involving multiple joints  Falls  Ileus (Multi)            PAtient seen and examined at bedside. Regards that he is overall doing well. Does have mild bowel sounds and is having good ostomy output. Encourage follow up with specialist and surgery. Hold off on flomax at this time as I suspect this was given due to patient being hypotensive in the hospital. We will recheck cbc, tsh cmp and mag s/p hospitalization. Otherwise continue supportive care. No additional issues.     Reviewed and approved by AYO KAMARA on 5/28/25 at 10:31 PM.     Electronically Signed By: Ayo Kamara DO   5/28/25 10:31 PM

## 2025-05-28 PROBLEM — Z09 HOSPITAL DISCHARGE FOLLOW-UP: Status: ACTIVE | Noted: 2025-05-28

## 2025-05-28 PROBLEM — K56.7 ILEUS (MULTI): Status: ACTIVE | Noted: 2025-05-28

## 2025-05-28 ASSESSMENT — ENCOUNTER SYMPTOMS
MUSCULOSKELETAL NEGATIVE: 1
RESPIRATORY NEGATIVE: 1
CARDIOVASCULAR NEGATIVE: 1
FATIGUE: 1
PSYCHIATRIC NEGATIVE: 1
WEAKNESS: 1
GASTROINTESTINAL NEGATIVE: 1

## 2025-05-29 NOTE — PROGRESS NOTES
Subjective   Patient ID: Hugo Mccauley is a 79 y.o. male.    Pt seen on hospital follow up. Regards he is doing well however recently was admitted to the hospital due to ileus/SBO. He endorses that he was admittted and improved with supportive care. Flomax was dc for the patient but he is unsure why. Otherwise he endorses that he does have good bowel sounds and bowel movements. Pain is well controlled and states that he may need additional surgeries due to hernias that were found. Otherwise no additional issues or concerns.            Review of Systems   Constitutional:  Positive for fatigue.   HENT: Negative.     Respiratory: Negative.     Cardiovascular: Negative.    Gastrointestinal: Negative.    Musculoskeletal: Negative.    Skin: Negative.    Neurological:  Positive for weakness.   Psychiatric/Behavioral: Negative.         Objective   Vitals Reviewed via facility EMR   Physical Exam  Constitutional:       General: He is not in acute distress.     Appearance: He is not ill-appearing.   Eyes:      Pupils: Pupils are equal, round, and reactive to light.   Cardiovascular:      Rate and Rhythm: Normal rate and regular rhythm.      Pulses: Normal pulses.      Heart sounds: No murmur heard.  Pulmonary:      Effort: No respiratory distress.      Breath sounds: No wheezing.   Abdominal:      General: Abdomen is flat. Bowel sounds are normal. There is no distension.      Comments: Bowel sounds present, ostomy present, having stool   Musculoskeletal:      Right lower leg: No edema.      Left lower leg: No edema.   Skin:     General: Skin is warm and dry.   Neurological:      Mental Status: He is alert. Mental status is at baseline.      Cranial Nerves: No cranial nerve deficit.      Motor: Weakness present.   Psychiatric:         Mood and Affect: Mood normal.         Behavior: Behavior normal.         Assessment/Plan   Diagnoses and all orders for this visit:  Chronic atrial fibrillation (Multi)  Gastroesophageal reflux  disease without esophagitis  Primary osteoarthritis involving multiple joints  Falls  Ileus (Multi)            PAtient seen and examined at bedside. Regards that he is overall doing well. Does have mild bowel sounds and is having good ostomy output. Encourage follow up with specialist and surgery. Hold off on flomax at this time as I suspect this was given due to patient being hypotensive in the hospital. We will recheck cbc, tsh cmp and mag s/p hospitalization. Otherwise continue supportive care. No additional issues.     Reviewed and approved by GERMAN KAMARA on 5/28/25 at 10:31 PM.

## 2025-07-15 ENCOUNTER — NURSING HOME VISIT (OUTPATIENT)
Dept: POST ACUTE CARE | Facility: EXTERNAL LOCATION | Age: 80
End: 2025-07-15
Payer: MEDICARE

## 2025-07-15 DIAGNOSIS — I16.0 HYPERTENSIVE URGENCY: ICD-10-CM

## 2025-07-15 DIAGNOSIS — I48.20 CHRONIC ATRIAL FIBRILLATION (MULTI): Primary | ICD-10-CM

## 2025-07-15 DIAGNOSIS — Z93.3 COLOSTOMY IN PLACE (MULTI): ICD-10-CM

## 2025-07-15 DIAGNOSIS — G20.A1 PARKINSON'S DISEASE WITHOUT DYSKINESIA OR FLUCTUATING MANIFESTATIONS: ICD-10-CM

## 2025-07-15 DIAGNOSIS — I1A.0 RESISTANT HYPERTENSION: ICD-10-CM

## 2025-07-15 DIAGNOSIS — M62.81 MUSCLE WEAKNESS (GENERALIZED): ICD-10-CM

## 2025-07-15 DIAGNOSIS — K21.9 GASTROESOPHAGEAL REFLUX DISEASE WITHOUT ESOPHAGITIS: ICD-10-CM

## 2025-07-15 PROCEDURE — 99349 HOME/RES VST EST MOD MDM 40: CPT | Performed by: STUDENT IN AN ORGANIZED HEALTH CARE EDUCATION/TRAINING PROGRAM

## 2025-07-15 ASSESSMENT — ENCOUNTER SYMPTOMS
PSYCHIATRIC NEGATIVE: 1
WEAKNESS: 1
MUSCULOSKELETAL NEGATIVE: 1
GASTROINTESTINAL NEGATIVE: 1
FATIGUE: 1
CARDIOVASCULAR NEGATIVE: 1
RESPIRATORY NEGATIVE: 1

## 2025-07-15 NOTE — LETTER
Patient: Hugo Mccauley  : 1945    Encounter Date: 07/15/2025    Subjective  Patient ID: Hugo Mccauley is a 79 y.o. male.    Patient seen in routine follow-up and sick visit.  Recently, has been having elevated blood pressure readings.  States that his blood pressure is in the 170s 180s especially at nighttime.  Is tolerating with his medications, but states that he is getting systolic readings in the 170s to 180s up to the 200s.  Was offered to go to the emergency room when outside elevated, but he deferred.  Otherwise, is asymptomatic from this.  Denies any additional issues or concerns.        Review of Systems   Constitutional:  Positive for fatigue.   HENT: Negative.     Respiratory: Negative.     Cardiovascular: Negative.    Gastrointestinal: Negative.    Musculoskeletal: Negative.    Skin: Negative.    Neurological:  Positive for weakness.   Psychiatric/Behavioral: Negative.         ObjectiveVitals Reviewed via facility EMR   Physical Exam  Constitutional:       General: He is not in acute distress.     Appearance: He is not ill-appearing.   Eyes:      Pupils: Pupils are equal, round, and reactive to light.   Cardiovascular:      Rate and Rhythm: Normal rate and regular rhythm.      Pulses: Normal pulses.      Heart sounds: No murmur heard.  Pulmonary:      Effort: No respiratory distress.      Breath sounds: No wheezing.   Abdominal:      General: Abdomen is flat. Bowel sounds are normal. There is no distension.      Comments: Ostomy in place, no issues   Musculoskeletal:      Right lower leg: No edema.      Left lower leg: No edema.   Skin:     General: Skin is warm and dry.   Neurological:      Mental Status: He is alert. Mental status is at baseline.      Cranial Nerves: No cranial nerve deficit.      Motor: Weakness present.   Psychiatric:         Mood and Affect: Mood normal.         Behavior: Behavior normal.         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  Overall medically stable however significantly elevated with regards to his blood pressure.  Recommend titration up of amlodipine and metoprolol to tartrate to 10 mg and 50 mg twice daily respectively.  Will start hydralazine 25 mg 3 times a day, as he would like better coverage throughout the day.  Obtain lab work in 2 weeks.  Extensively discussed care plan with staff and no issues noted.        Reviewed and approved by AYO KAMARA on 7/15/25 at 10:46 PM.       Electronically Signed By: Ayo Kamara DO   7/15/25 10:46 PM

## 2025-07-16 NOTE — PROGRESS NOTES
Subjective   Patient ID: Hugo Mccauley is a 79 y.o. male.    Patient seen in routine follow-up and sick visit.  Recently, has been having elevated blood pressure readings.  States that his blood pressure is in the 170s 180s especially at nighttime.  Is tolerating with his medications, but states that he is getting systolic readings in the 170s to 180s up to the 200s.  Was offered to go to the emergency room when outside elevated, but he deferred.  Otherwise, is asymptomatic from this.  Denies any additional issues or concerns.        Review of Systems   Constitutional:  Positive for fatigue.   HENT: Negative.     Respiratory: Negative.     Cardiovascular: Negative.    Gastrointestinal: Negative.    Musculoskeletal: Negative.    Skin: Negative.    Neurological:  Positive for weakness.   Psychiatric/Behavioral: Negative.         Objective Vitals Reviewed via facility EMR   Physical Exam  Constitutional:       General: He is not in acute distress.     Appearance: He is not ill-appearing.   Eyes:      Pupils: Pupils are equal, round, and reactive to light.   Cardiovascular:      Rate and Rhythm: Normal rate and regular rhythm.      Pulses: Normal pulses.      Heart sounds: No murmur heard.  Pulmonary:      Effort: No respiratory distress.      Breath sounds: No wheezing.   Abdominal:      General: Abdomen is flat. Bowel sounds are normal. There is no distension.      Comments: Ostomy in place, no issues   Musculoskeletal:      Right lower leg: No edema.      Left lower leg: No edema.   Skin:     General: Skin is warm and dry.   Neurological:      Mental Status: He is alert. Mental status is at baseline.      Cranial Nerves: No cranial nerve deficit.      Motor: Weakness present.   Psychiatric:         Mood and Affect: Mood normal.         Behavior: Behavior normal.         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  Overall medically stable however significantly elevated with regards to his blood pressure.  Recommend titration up of amlodipine and metoprolol to tartrate to 10 mg and 50 mg twice daily respectively.  Will start hydralazine 25 mg 3 times a day, as he would like better coverage throughout the day.  Obtain lab work in 2 weeks.  Extensively discussed care plan with staff and no issues noted.        Reviewed and approved by GERMAN KAMARA on 7/15/25 at 10:46 PM.